# Patient Record
Sex: MALE | Race: WHITE | NOT HISPANIC OR LATINO | URBAN - METROPOLITAN AREA
[De-identification: names, ages, dates, MRNs, and addresses within clinical notes are randomized per-mention and may not be internally consistent; named-entity substitution may affect disease eponyms.]

---

## 2018-01-17 ENCOUNTER — EMERGENCY (EMERGENCY)
Facility: HOSPITAL | Age: 48
LOS: 1 days | End: 2018-01-17
Payer: COMMERCIAL

## 2018-01-17 PROCEDURE — 99284 EMERGENCY DEPT VISIT MOD MDM: CPT

## 2018-01-17 PROCEDURE — 70450 CT HEAD/BRAIN W/O DYE: CPT | Mod: 26

## 2019-11-20 PROBLEM — Z00.00 ENCOUNTER FOR PREVENTIVE HEALTH EXAMINATION: Status: ACTIVE | Noted: 2019-11-20

## 2019-11-26 ENCOUNTER — APPOINTMENT (OUTPATIENT)
Dept: ORTHOPEDIC SURGERY | Facility: CLINIC | Age: 49
End: 2019-11-26
Payer: MEDICAID

## 2019-11-26 VITALS — WEIGHT: 205 LBS | BODY MASS INDEX: 32.95 KG/M2 | HEIGHT: 66 IN

## 2019-11-26 VITALS — SYSTOLIC BLOOD PRESSURE: 131 MMHG | DIASTOLIC BLOOD PRESSURE: 80 MMHG | HEART RATE: 74 BPM

## 2019-11-26 DIAGNOSIS — M19.021 PRIMARY OSTEOARTHRITIS, RIGHT ELBOW: ICD-10-CM

## 2019-11-26 DIAGNOSIS — G56.01 CARPAL TUNNEL SYNDROME, RIGHT UPPER LIMB: ICD-10-CM

## 2019-11-26 PROCEDURE — 73080 X-RAY EXAM OF ELBOW: CPT | Mod: RT

## 2019-11-26 PROCEDURE — 99203 OFFICE O/P NEW LOW 30 MIN: CPT

## 2019-11-27 PROBLEM — G56.01 CARPAL TUNNEL SYNDROME OF RIGHT WRIST: Status: ACTIVE | Noted: 2019-11-27

## 2019-11-27 PROBLEM — M19.021 PRIMARY OSTEOARTHRITIS OF RIGHT ELBOW: Status: ACTIVE | Noted: 2019-11-27

## 2019-11-27 NOTE — HISTORY OF PRESENT ILLNESS
[de-identified] : 49 year old male alcoholic presents today with right thumb, index and middle finger numbness. PMHx significant for right elbow fx in 2017 and subsequent elbow arthroscopy ROLB and ulnar nerve decompression in 2018,  He started to have clicking in the elbow in April 2019, then sustained a fall last month on elbow but had no pain. He was not evaluated after the fall.  He was hospitalized on 11/3/19 for detoxification 2 weeks after the fall. He states that after getting IV placed in the right arm he started to have  numbness and tingling in thumb, index and middle finger. He is concerned about constant numbness in his fingers and is concerned about his elbow.\par \par The patient's past medical history, past surgical history, medications and allergies were reviewed by me today with the patient and documented accordingly. In addition, the patient's family and social history, which were noncontributory to this visit, were reviewed also.

## 2019-11-27 NOTE — PHYSICAL EXAM
[de-identified] : Oriented to time, place, person\par Mood: Normal\par Affect: Normal\par Appearance: Healthy, well appearing, no acute distress.\par Gait: Normal\par Assistive Devices: None\par \par Right elbow exam\par \par Skin: Clean, dry, intact. No ecchymosis. Mild hand swelling. No palpable joint effusion.\par ROM: , full supination/pronation.  \par Painful ROM: None\par Tenderness: No medial epicondyle pain. No lateral epicondyle pain. No olecranon pain. No pain at radial head.\par Strength: 5/5 elbow flexion, 5/5 elbow extension, 5/5 supination, 5/5 pronation\par Stability: Stable to vaus/valgus stress\par Vasc: 2+ radial pulse, <2s cap refill\par Sensation: In tact to light touch throughout, parasthesia in median distribution\par Neuro: + tinels at ulnar canal, AIN/PIN/Ulnar nerve in tact to motor/sensation. Positive Durkins test.  [de-identified] : The following radiographs were ordered and read by me during this patients visit. I reviewed each radiograph in detail with the patient and discussed the findings as highlighted below. \par \par 3 views of the right elbow were obtained today, 11/26/2019, that show posterior osteophytes and loose bodies. No acute fracture or dislocation. There is radiocapitellar and ulnar humeral degenerative change seen. There is no gross malalignment. No significant other obvious osseous abnormality, otherwise unremarkable.\par

## 2019-11-27 NOTE — ADDENDUM
[FreeTextEntry1] : This note was written by Latia Light on 11/26/2019 acting solely as a scribe for Dr. Ellis Sewell.\par \par All medical record entries made by the Scribe were at my, Dr. Ellis Sewell, direction and personally dictated by me on 11/26/2019. I have personally reviewed the chart and agree that the record accurately reflects my personal performance of the history, physical exam, assessment and plan.\par

## 2019-11-27 NOTE — DISCUSSION/SUMMARY
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 X-ray shows some early degenerative changes, with continued loose bodies within the posterior elbow. But does not seem to give him significant trouble at this time. Patient is more concerned regarding his paresthesias within the median nerve distribution, and presents with acute carpal tunnel. I am unclear as to whether the IV caused his paresthesias, and is more likely due to some persistent edema within the right upper extremity.\par \par Recommendations: Patient may also use OTC NSAID's or acetaminophen as tolerated, with application of ice to the area 2-3x daily for 20 minutes after periods of activity. Volar wrist brace as discussed. \par \par Follow up with Ortho hand for persistent symptoms.

## 2020-03-02 ENCOUNTER — APPOINTMENT (OUTPATIENT)
Dept: HEPATOLOGY | Facility: CLINIC | Age: 50
End: 2020-03-02

## 2023-03-24 ENCOUNTER — APPOINTMENT (OUTPATIENT)
Dept: ORTHOPEDIC SURGERY | Facility: CLINIC | Age: 53
End: 2023-03-24
Payer: MEDICAID

## 2023-03-24 VITALS — BODY MASS INDEX: 33.75 KG/M2 | WEIGHT: 210 LBS | HEIGHT: 66 IN

## 2023-03-24 DIAGNOSIS — M19.012 PRIMARY OSTEOARTHRITIS, LEFT SHOULDER: ICD-10-CM

## 2023-03-24 DIAGNOSIS — M75.42 IMPINGEMENT SYNDROME OF LEFT SHOULDER: ICD-10-CM

## 2023-03-24 PROCEDURE — 99204 OFFICE O/P NEW MOD 45 MIN: CPT | Mod: 25

## 2023-03-24 PROCEDURE — J3490M: CUSTOM

## 2023-03-24 PROCEDURE — 20611 DRAIN/INJ JOINT/BURSA W/US: CPT | Mod: LT

## 2023-04-02 NOTE — DISCUSSION/SUMMARY
[de-identified] : Assessment: The patient is a 53 year old male with chronic left shoulder pain and physical exam findings consistent with impingement syndrome and AC joint OA.\par \par Patient and I discussed their symptoms. Discussed findings of today's exam and possible causes of patient's pain. Educated patient on their most probable diagnosis. Reviewed possible courses of treatment, and we collaboratively decided best course of treatment at this time will include:\par \par 1. CSI left SA space tolerated well\par 2. PT\par 3. NSAIDs prn\par \par The patient's current medication management of their orthopedic diagnosis was reviewed today: \par \par (1) We discussed a comprehensive treatment plan that included possible pharmaceutical management involving the use of prescription strength medications including but not limited to options such as oral Naprosyn 500mg BID, once daily Meloxicam 15 mg, or 500-650 mg Tylenol versus over the counter oral medications and topical prescription NSAID Pennsaid vs over the counter Voltaren gel. \par \par (2) There is a moderate risk of morbidity with further treatment, especially from use of prescription strength medications and possible side effects of these medications which include upset stomach with oral medications, skin reactions to topical medications and cardiac/renal issues with long term use. \par \par (3) I recommended that the patient follow-up with their medical physician to discuss any significant specific potential issues with long term medication use such as interactions with current medications or with exacerbation of underlying medical comorbidities. \par \par (4) The benefits and risks associated with use of injectable, oral or topical, prescription and over the counter anti-inflammatory medications were discussed with the patient. The patient voiced understanding of the risks including but not limited to bleeding, stroke, kidney dysfunction, heart disease, and were referred to the black box warning label for further information.\par \par Prior to appointment and during encounter with patient extensive medical records were reviewed including but not limited to, hospital records, out patient records, imaging results, and lab data. During this appointment the patient was examined, diagnoses were discussed and explained in a face to face manner. In addition extensive time was spent reviewing aforementioned diagnostic studies. Counseling including abnormal image results, differential diagnoses, treatment options, risk and benefits, lifestyle changes, current condition, and current medications was performed. Patient's comments, questions, and concerns were address and patient verbalized understanding.\par \par Follow up in 4-6 weeks.\par

## 2023-04-02 NOTE — IMAGING
[Left] : left shoulder [There are no fractures, subluxations or dislocations. No significant abnormalities are seen] : There are no fractures, subluxations or dislocations. No significant abnormalities are seen [Type 2 acromion] : Type 2 acromion [AC Joint Arthrosis] : AC Joint Arthrosis [de-identified] : The patient is a well appearing 53 year year old male of their stated age.\par Neck is supple & nontender to palpation. Negative Spurling's test.\par \par General: in no acute distress, seated comfortably, moving easily\par Skin: No discoloration, rashes; on palpation skin is dry, \par Neuro: Normal sensation all dermatomes, motor all myotomes\par Vascular: Normal pulses, no edema, normal temperature\par Coordination and balance: Normal\par Psych: normal mood and affect, non pressured speech, alert and oriented x3\par \par Effected Shoulder \par Inspection:\par Scapula Winging: Negative\par Deformity: None\par Erythema: None\par Ecchymosis: None\par Abrasions: None\par Effusion: None\par \par Range of Motion:\par Active Forward Flexion: 160 degrees \par Passive Forward Flexion: 170 degrees \par Active IR : L4\par Passive ER : 30 degrees\par \par Motor Exam:\par Forward Flexion: 4+ out of 5\par Flexion Plane of Scapula: 5 out of 5\par Abduction: 4+ out of 5\par Internal Rotation: 5 out of 5\par External Rotation: 4+ out of 5\par Distal Motor Strength: 5 out of 5\par \par Stability Testing:\par Anterior: 1+\par Posterior: 1+\par Sulcus N: 1+\par Sulcus ER: 1+\par \par Provocative Tests:\par Drop Arm: Negative\par Zavaleta/Impingement: Positive\par [place]: Positive\par X-Arm Adduction: Negative\par Belly Press: Negative\par Bear Hug: Negative\par Lift Off: Negative\par Apprehension: Negative\par Relocation: Negative\par Posterior Load & Shift: Negative\par \par Palpation:\par AC Joint: ttp\par Clavicle: Nontender\par SC Joint: Nontender\par Bicepital Groove: Positive\par Coracoid Process: Nontender\par Pectoralis Minor Tendon: Nontender\par Pectoralis Major Tendon: Nontender & palpably intact\par Latissimus Dorsi: Nontender \par Proximal Humerus: Positive\par Scapula Body: Nontender\par Medial Scapula Boarder: Nontender\par Scapula Spine: Nontender\par \par Neurologic Exam: Sensation to Light Touch:\par Axillary: Grossly intact\par Ulnar: Grossly intact\par Radial: Grossly intact\par Median: Grossly intact\par Other:  N/A\par \par Circulatory/Pulses:\par Ulnar: 2+\par Radial: 2+\par Other Pertinent Findings: None\par \par Contralateral Shoulder\par Range of Motion:\par Active Forward Flexion: 180 degrees \par Active Abduction: 180 degrees \par Passive Forward Flexion: 180 degrees \par Passive Abduction: 180 degrees \par ER @ 90 degrees: 90 degrees\par IR @ 90 degrees: 45 degrees\par ER @ 0 degrees: 50 degrees\par \par Motor Exam:\par Forward Flexion: 5 out of 5\par Flexion Plane of Scapula: 5 out of 5\par Abduction: 5 out of 5\par Internal Rotation: 5 out of 5\par External Rotation: 5 out of 5\par Distal Motor Strength: 5 out of 5\par \par Stability Testing:\par Anterior: 1+\par Posterior: 1+\par Sulcus N: 1+\par Sulcus ER: 1+\par \par Other Pertinent Findings: None\par \par

## 2023-04-02 NOTE — HISTORY OF PRESENT ILLNESS
[de-identified] : The patient is a 53 year old right hand dominant M who presents today complaining of left shoulder pain.  Pt has a history of right rotator cuff repair.  Pt had CSI but never did therapy.\par Date of Injury/Onset: \par Pain:    At Rest: 1/10 \par With Activity:  6/10 \par Mechanism of injury: \par This is [not] a Work Related Injury being treated under Worker's Compensation.\par This is [not] an athletic injury occurring associated with an interscholastic or organized sports team.\par Quality of symptoms: aching\par Improves with: rest\par Worse with: movement\par Additional Information: [None]\par \par  [FreeTextEntry1] : left shoulder  [FreeTextEntry5] : MUNDO OVERTON is a 53 year old male here today complaining of left shoulder pain.

## 2023-08-08 ENCOUNTER — APPOINTMENT (OUTPATIENT)
Dept: ORTHOPEDIC SURGERY | Facility: CLINIC | Age: 53
End: 2023-08-08

## 2023-08-29 ENCOUNTER — HOSPITAL ENCOUNTER (INPATIENT)
Dept: HOSPITAL 74 - YASAS | Age: 53
LOS: 3 days | Discharge: LEFT BEFORE DISCHARGE | DRG: 770 | End: 2023-09-01
Attending: PSYCHIATRY & NEUROLOGY | Admitting: ALLERGY & IMMUNOLOGY
Payer: COMMERCIAL

## 2023-08-29 VITALS — BODY MASS INDEX: 28.7 KG/M2

## 2023-08-29 DIAGNOSIS — F10.20: Primary | ICD-10-CM

## 2023-08-29 DIAGNOSIS — E72.20: ICD-10-CM

## 2023-08-29 DIAGNOSIS — K74.60: ICD-10-CM

## 2023-08-29 DIAGNOSIS — H00.015: ICD-10-CM

## 2023-08-29 DIAGNOSIS — Z20.822: ICD-10-CM

## 2023-08-29 PROCEDURE — HZ42ZZZ GROUP COUNSELING FOR SUBSTANCE ABUSE TREATMENT, COGNITIVE-BEHAVIORAL: ICD-10-PCS | Performed by: PSYCHIATRY & NEUROLOGY

## 2023-08-29 RX ADMIN — Medication SCH MG: at 21:55

## 2023-08-30 LAB
ALBUMIN SERPL-MCNC: 2.5 G/DL (ref 3.4–5)
ALP SERPL-CCNC: 180 U/L (ref 45–117)
ALT SERPL-CCNC: 55 U/L (ref 13–61)
ANION GAP SERPL CALC-SCNC: 6 MMOL/L (ref 8–16)
APPEARANCE UR: CLEAR
AST SERPL-CCNC: 91 U/L (ref 15–37)
BACTERIA # UR AUTO: 2 /UL (ref 0–1359)
BILIRUB SERPL-MCNC: 6.7 MG/DL (ref 0.2–1)
BILIRUB UR STRIP.AUTO-MCNC: (no result) MG/DL
BUN SERPL-MCNC: 11.6 MG/DL (ref 7–18)
CALCIUM SERPL-MCNC: 8.5 MG/DL (ref 8.5–10.1)
CASTS URNS QL MICRO: 0 /UL (ref 0–3.1)
CHLORIDE SERPL-SCNC: 108 MMOL/L (ref 98–107)
CO2 SERPL-SCNC: 25 MMOL/L (ref 21–32)
COLOR UR: (no result)
CREAT SERPL-MCNC: 0.8 MG/DL (ref 0.55–1.3)
DEPRECATED RDW RBC AUTO: 17 % (ref 11.9–15.9)
EPITH CASTS URNS QL MICRO: 4 /UL (ref 0–25.1)
GLUCOSE SERPL-MCNC: 111 MG/DL (ref 74–106)
HCT VFR BLD CALC: 32.7 % (ref 35.4–49)
HGB BLD-MCNC: 11.4 GM/DL (ref 11.7–16.9)
KETONES UR QL STRIP: NEGATIVE
LEUKOCYTE ESTERASE UR QL STRIP.AUTO: NEGATIVE
MCH RBC QN AUTO: 33.6 PG (ref 25.7–33.7)
MCHC RBC AUTO-ENTMCNC: 34.7 G/DL (ref 32–35.9)
MCV RBC: 96.9 FL (ref 80–96)
NITRITE UR QL STRIP: POSITIVE
PH UR: 6 [PH] (ref 5–8)
PLATELET # BLD AUTO: 91 10^3/UL (ref 134–434)
PMV BLD: 8.6 FL (ref 7.5–11.1)
POTASSIUM SERPLBLD-SCNC: 3.9 MMOL/L (ref 3.5–5.1)
PROT SERPL-MCNC: 7.3 G/DL (ref 6.4–8.2)
PROT UR QL STRIP: NEGATIVE
PROT UR QL STRIP: NEGATIVE
RBC # BLD AUTO: 3.38 M/MM3 (ref 4–5.6)
RBC # BLD AUTO: 34 /UL (ref 0–23.9)
SODIUM SERPL-SCNC: 139 MMOL/L (ref 136–145)
SP GR UR: 1.02 (ref 1.01–1.03)
TREPONEMA PALLIDUM AB [UNITS/VOLUME] IN SERUM OR PLASMA BY IMMUNOASSAY: (no result)
WBC # BLD AUTO: 6.7 K/MM3 (ref 4–10)
WBC # UR AUTO: 4 /UL (ref 0–25.8)

## 2023-08-30 RX ADMIN — LACTULOSE SCH GM: 20 SOLUTION ORAL at 21:41

## 2023-08-30 RX ADMIN — Medication SCH MG: at 21:41

## 2023-08-30 RX ADMIN — LACTULOSE SCH GM: 20 SOLUTION ORAL at 17:12

## 2023-08-30 RX ADMIN — ERYTHROMYCIN SCH APPLIC: 5 OINTMENT OPHTHALMIC at 21:44

## 2023-08-30 RX ADMIN — Medication SCH: at 10:01

## 2023-08-30 RX ADMIN — IBUPROFEN PRN MG: 400 TABLET, FILM COATED ORAL at 13:40

## 2023-08-31 VITALS — RESPIRATION RATE: 18 BRPM

## 2023-08-31 RX ADMIN — IBUPROFEN PRN MG: 400 TABLET, FILM COATED ORAL at 15:54

## 2023-08-31 RX ADMIN — ANALGESIC BALM SCH: 1.74; 4.06 OINTMENT TOPICAL at 22:52

## 2023-08-31 RX ADMIN — LACTULOSE SCH: 20 SOLUTION ORAL at 22:52

## 2023-08-31 RX ADMIN — ERYTHROMYCIN SCH: 5 OINTMENT OPHTHALMIC at 22:52

## 2023-08-31 RX ADMIN — IBUPROFEN PRN MG: 400 TABLET, FILM COATED ORAL at 11:57

## 2023-08-31 RX ADMIN — IBUPROFEN PRN MG: 400 TABLET, FILM COATED ORAL at 08:02

## 2023-08-31 RX ADMIN — LACTULOSE SCH GM: 20 SOLUTION ORAL at 14:17

## 2023-08-31 RX ADMIN — LACTULOSE SCH: 20 SOLUTION ORAL at 18:59

## 2023-08-31 RX ADMIN — Medication SCH TAB: at 09:39

## 2023-08-31 RX ADMIN — Medication SCH: at 22:52

## 2023-08-31 RX ADMIN — LACTULOSE SCH GM: 20 SOLUTION ORAL at 09:39

## 2023-08-31 RX ADMIN — ANALGESIC BALM SCH APPLIC: 1.74; 4.06 OINTMENT TOPICAL at 14:17

## 2023-09-01 VITALS — SYSTOLIC BLOOD PRESSURE: 135 MMHG | HEART RATE: 70 BPM | TEMPERATURE: 97.5 F | DIASTOLIC BLOOD PRESSURE: 86 MMHG

## 2023-09-01 RX ADMIN — ANALGESIC BALM SCH: 1.74; 4.06 OINTMENT TOPICAL at 09:57

## 2023-09-01 RX ADMIN — Medication SCH: at 09:57

## 2023-09-01 RX ADMIN — LACTULOSE SCH: 20 SOLUTION ORAL at 09:57

## 2024-04-11 ENCOUNTER — APPOINTMENT (OUTPATIENT)
Dept: ORTHOPEDIC SURGERY | Facility: CLINIC | Age: 54
End: 2024-04-11

## 2024-04-16 ENCOUNTER — APPOINTMENT (OUTPATIENT)
Dept: ORTHOPEDIC SURGERY | Facility: CLINIC | Age: 54
End: 2024-04-16
Payer: MEDICAID

## 2024-04-16 DIAGNOSIS — M19.012 PRIMARY OSTEOARTHRITIS, LEFT SHOULDER: ICD-10-CM

## 2024-04-16 DIAGNOSIS — M75.102 UNSPECIFIED ROTATOR CUFF TEAR OR RUPTURE OF LEFT SHOULDER, NOT SPECIFIED AS TRAUMATIC: ICD-10-CM

## 2024-04-16 PROCEDURE — J3490M: CUSTOM

## 2024-04-16 PROCEDURE — 99214 OFFICE O/P EST MOD 30 MIN: CPT | Mod: 25

## 2024-04-16 PROCEDURE — 73010 X-RAY EXAM OF SHOULDER BLADE: CPT | Mod: LT

## 2024-04-16 PROCEDURE — 73030 X-RAY EXAM OF SHOULDER: CPT | Mod: LT

## 2024-04-16 PROCEDURE — 20610 DRAIN/INJ JOINT/BURSA W/O US: CPT | Mod: LT

## 2024-04-16 NOTE — HISTORY OF PRESENT ILLNESS
[de-identified] : This is Mr. MUNDO OVERTON  a 54 year old male who comes in today complaining of left shoulder pain for years. Saw Dr Cope, had a CSI which gave him 1 years relief.  recovering alcoholic, with low platelet count.  notes he will be entering 15 month long term rehab in 1 week  November 2023 platelet count - 80 [FreeTextEntry1] : left shoulder

## 2024-04-16 NOTE — DISCUSSION/SUMMARY
[de-identified] : Discussed options, Pt is starting a 9-12 month treatment program starting in a few weeks Plan for left shoulder SAC Obtain MRI left shoulder eval tear progression  no intention for surgery in next few months as detox is priority at this time.  f/u p MRI ----------------------------------------------------------------------------  Large joint corticosteroid injection given: Left shoulder subacromial  Patient indicated for injection after trial of rest, OTC medications including aspirin, Ibuprofen, Aleve etc or prescription NSAIDS, and/or exercises at home and/ or physical therapy without satisfactory response.  Patient has symptoms including pain, swelling, and/or decreased mobility in the joint. The risks, benefits, and alternatives to corticosteroid injection were explained in full to the patient, including but not limited to infection, sepsis, bleeding, scarring, skin discoloration, temporary increase in pain, syncopal episode, failure to resolve symptoms, allergic reaction, symptom recurrence, and elevation of blood sugar in diabetics. Patient understood the risks. All questions were answered. After discussion of options, patient requested an injection.   Oral informed consent was obtained and sterile technique was utilized for the procedure including the preparation of the solutions used for the injection and betadine followed by alcohol prep to the injection site. Anesthesia was given with ethyl chloride sprayed topically. The injection was delivered. Patient tolerated the procedure well.   Post Procedure Instructions: Patient was advised to call if redness, pain, or fever occur and apply ice for 15 min on and 15 min off later today  Medications delivered: Kenalog: 10 mg, Lidocaine: 4 cc, Marcaine: 4 cc.      ----------------------------------------------------------------------------   All relevant imaging studies pertinent to today's visit, including x-rays, MRI's and/or other advanced imaging studies (CT/etc) were independently interpreted and reviewed with the patient as needed. Implications of the studies together with the patient's clinical picture were discussed to formulate a working diagnosis and management options were detailed.   The patient and/or guardian was advised of the diagnosis.  The natural history of the pathology was explained in full. All questions were answered.  The risks and benefits of conservative and interventional treatment alternatives were explained to the patient  The patient and/or guardian was advised if any advanced diagnostic/imaging study (MRI/CT/etc) is ordered to evaluate potential pathology in the affected area(s), they should follow up in the office to review the results of the study and determine further management that may be indicated.

## 2024-04-16 NOTE — IMAGING
[Left] : left shoulder [AC Joint Arthrosis] : AC Joint Arthrosis [de-identified] :   ----------------------------------------------------------------------------   Left shoulder exam: AC TTP    Skin: no significant pertinent finding  Inspection: no obvious deformity, no obvious masses, no swelling, no effusion, no atrophy  ROM:     FF: 150 ff     ER: 70     IR: T12  Tenderness:     (neg) Anterior/Biceps:     (neg) Posterior     (neg) Lateral     (neg) Trapezius     (neg) Scapula     (neg) AC joint     (neg) Crepitus with ROM  Stability:     (neg) Translation     (neg) Apprehension     (neg) Clicking  Additional tests:     (neg) Neer's     (neg)Hawkin's     (neg) Pearson's     (neg) Speed     (neg) Cross chest adduction  Strength:     FF: 4/5     ER: 4/5     IR: 5/5     Biceps: 5/5     Triceps: 5/5     Distal: 5/5  Neuro: In tact to light touch throughout  Vascularity: Extremity warm and well perfused  [FreeTextEntry1] : 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

## 2025-03-18 ENCOUNTER — APPOINTMENT (OUTPATIENT)
Dept: ORTHOPEDIC SURGERY | Facility: CLINIC | Age: 55
End: 2025-03-18
Payer: MEDICAID

## 2025-03-18 DIAGNOSIS — M19.021 PRIMARY OSTEOARTHRITIS, RIGHT ELBOW: ICD-10-CM

## 2025-03-18 PROCEDURE — 73080 X-RAY EXAM OF ELBOW: CPT | Mod: RT

## 2025-03-18 PROCEDURE — 99204 OFFICE O/P NEW MOD 45 MIN: CPT

## 2025-03-18 RX ORDER — METHYLPREDNISOLONE 4 MG/1
4 TABLET ORAL
Qty: 1 | Refills: 0 | Status: ACTIVE | COMMUNITY
Start: 2025-03-18 | End: 1900-01-01